# Patient Record
Sex: FEMALE | Race: ASIAN | NOT HISPANIC OR LATINO
[De-identification: names, ages, dates, MRNs, and addresses within clinical notes are randomized per-mention and may not be internally consistent; named-entity substitution may affect disease eponyms.]

---

## 2020-01-27 PROBLEM — Z00.00 ENCOUNTER FOR PREVENTIVE HEALTH EXAMINATION: Status: ACTIVE | Noted: 2020-01-27

## 2020-02-11 ENCOUNTER — APPOINTMENT (OUTPATIENT)
Dept: PULMONOLOGY | Facility: CLINIC | Age: 65
End: 2020-02-11
Payer: MEDICAID

## 2020-02-11 VITALS
WEIGHT: 120 LBS | SYSTOLIC BLOOD PRESSURE: 117 MMHG | DIASTOLIC BLOOD PRESSURE: 78 MMHG | TEMPERATURE: 98 F | RESPIRATION RATE: 16 BRPM | BODY MASS INDEX: 21.26 KG/M2 | HEIGHT: 63 IN | HEART RATE: 74 BPM | OXYGEN SATURATION: 98 %

## 2020-02-11 DIAGNOSIS — Z85.09 PERSONAL HISTORY OF MALIGNANT NEOPLASM OF OTHER DIGESTIVE ORGANS: ICD-10-CM

## 2020-02-11 DIAGNOSIS — E03.9 HYPOTHYROIDISM, UNSPECIFIED: ICD-10-CM

## 2020-02-11 DIAGNOSIS — R06.83 SNORING: ICD-10-CM

## 2020-02-11 DIAGNOSIS — F51.04 PSYCHOPHYSIOLOGIC INSOMNIA: ICD-10-CM

## 2020-02-11 PROCEDURE — 99204 OFFICE O/P NEW MOD 45 MIN: CPT

## 2020-02-11 RX ORDER — LEVOTHYROXINE SODIUM 137 UG/1
TABLET ORAL
Refills: 0 | Status: ACTIVE | COMMUNITY

## 2020-02-11 RX ORDER — OMEPRAZOLE 40 MG/1
CAPSULE, DELAYED RELEASE ORAL
Refills: 0 | Status: ACTIVE | COMMUNITY

## 2020-02-11 NOTE — HISTORY OF PRESENT ILLNESS
[FreeTextEntry1] : 65yo F with long standing history of trouble falling asleep and staying asleep. These symptoms are inconsistent and present intermittently. She is sometimes sleepy during the day even if she gets adequate duration the night before. She does snore and sometimes has noted nocturnal gasping and choking. She also falls asleep during the day when engaged in passive situations.

## 2020-02-11 NOTE — REVIEW OF SYSTEMS
[EDS: ESS=____] : daytime somnolence: ESS=[unfilled] [Fatigue] : fatigue [Snoring] : snoring [A.M. Dry Mouth] : a.m. dry mouth [A.M. Headache] : headache present upon awakening [Difficulty Initiating Sleep] : difficulty falling asleep [Anxious] : anxious [Difficulty Maintaining Sleep] : difficulty maintaining sleep [Chest Pain] : no chest pain [Obesity] : not obese [Anemia] : no anemia [Heartburn] : no heartburn [Lower Extremity Discomfort] : no lower extremity discomfort [Arthralgias] : no arthralgias [Nocturia] : no nocturia [Cataplexy] :  no cataplexy [Unusual Sleep Behavior] : no unusual sleep behavior

## 2020-02-11 NOTE — ASSESSMENT
[FreeTextEntry1] : 65yo F with long standing history of trouble falling asleep and staying asleep. These symptoms are inconsistent and present intermittently. She is sometimes sleepy during the day even if she gets adequate duration the night before. She does snore and sometimes has noted nocturnal gasping and choking. She also falls asleep during the day when engaged in passive situations. \par \par Crowded airway, snoring, gasping and choking -- at risk for DIANA\par Will order diagnostic PSG\par I explained the rationale for treatment of DIANA -- to improve quality of life, daytime function and to decrease the cardiometabolic and other medical risks that are associated with untreated DIANA. The patient verbalized understanding.\par I also explained that the patient can expect a follow up call once results of the above study becomes available.\par \par Chronic insomnia may be comorbid -- will address further if no significant DIANA is present\par Stimulus control: I advised the patient to avoid spending large amounts of time in bed awake, to get into bed only when sleepy in order to increase sleep efficiency. I explained the role of this intervention in stregthening the connection between the bed, bedroom environment and the ability to fall asleep.\par She may need CBT

## 2020-02-11 NOTE — PHYSICAL EXAM
[General Appearance - In No Acute Distress] : no acute distress [Normal Conjunctiva] : the conjunctiva exhibited no abnormalities [III] : III [Neck Appearance] : the appearance of the neck was normal [] : the neck was supple [Heart Rate And Rhythm] : heart rate was normal and rhythm regular [Heart Sounds] : normal S1 and S2 [Respiration, Rhythm And Depth] : normal respiratory rhythm and effort [Auscultation Breath Sounds / Voice Sounds] : lungs were clear to auscultation bilaterally [Involuntary Movements] : no involuntary movements were seen [Nail Clubbing] : no clubbing of the fingernails [Non-Pitting] : non-pitting [Skin Color & Pigmentation] : normal skin color and pigmentation [No Focal Deficits] : no focal deficits [Oriented To Time, Place, And Person] : oriented to person, place, and time [Low Lying Soft Palate] : low lying soft palate [Normal Oropharynx] : abnormal oropharynx

## 2020-04-22 ENCOUNTER — APPOINTMENT (OUTPATIENT)
Dept: SLEEP CENTER | Facility: CLINIC | Age: 65
End: 2020-04-22

## 2020-06-29 ENCOUNTER — FORM ENCOUNTER (OUTPATIENT)
Age: 65
End: 2020-06-29

## 2020-06-30 ENCOUNTER — APPOINTMENT (OUTPATIENT)
Dept: SLEEP CENTER | Facility: CLINIC | Age: 65
End: 2020-06-30
Payer: MEDICAID

## 2020-06-30 ENCOUNTER — OUTPATIENT (OUTPATIENT)
Dept: OUTPATIENT SERVICES | Facility: HOSPITAL | Age: 65
LOS: 1 days | End: 2020-06-30
Payer: MEDICAID

## 2020-06-30 PROCEDURE — 95806 SLEEP STUDY UNATT&RESP EFFT: CPT | Mod: 26

## 2020-06-30 PROCEDURE — 95806 SLEEP STUDY UNATT&RESP EFFT: CPT

## 2020-07-09 DIAGNOSIS — G47.33 OBSTRUCTIVE SLEEP APNEA (ADULT) (PEDIATRIC): ICD-10-CM

## 2020-07-21 ENCOUNTER — APPOINTMENT (OUTPATIENT)
Dept: PULMONOLOGY | Facility: CLINIC | Age: 65
End: 2020-07-21

## 2021-01-08 ENCOUNTER — INPATIENT (INPATIENT)
Facility: HOSPITAL | Age: 66
LOS: 0 days | Discharge: HOME | DRG: 313 | End: 2021-01-09
Attending: INTERNAL MEDICINE | Admitting: INTERNAL MEDICINE
Payer: MEDICAID

## 2021-01-08 VITALS
SYSTOLIC BLOOD PRESSURE: 135 MMHG | OXYGEN SATURATION: 97 % | DIASTOLIC BLOOD PRESSURE: 84 MMHG | TEMPERATURE: 98 F | HEART RATE: 69 BPM | WEIGHT: 145.06 LBS | RESPIRATION RATE: 18 BRPM

## 2021-01-08 LAB
ALBUMIN SERPL ELPH-MCNC: 4.7 G/DL — SIGNIFICANT CHANGE UP (ref 3.3–5)
ALP SERPL-CCNC: 66 U/L — SIGNIFICANT CHANGE UP (ref 40–120)
ALT FLD-CCNC: 17 U/L — SIGNIFICANT CHANGE UP (ref 10–45)
ANION GAP SERPL CALC-SCNC: 9 MMOL/L — SIGNIFICANT CHANGE UP (ref 5–17)
AST SERPL-CCNC: 21 U/L — SIGNIFICANT CHANGE UP (ref 10–40)
BASE EXCESS BLDV CALC-SCNC: 3.9 MMOL/L — HIGH (ref -2–2)
BASOPHILS # BLD AUTO: 0.03 K/UL — SIGNIFICANT CHANGE UP (ref 0–0.2)
BASOPHILS NFR BLD AUTO: 0.6 % — SIGNIFICANT CHANGE UP (ref 0–2)
BILIRUB SERPL-MCNC: 0.2 MG/DL — SIGNIFICANT CHANGE UP (ref 0.2–1.2)
BUN SERPL-MCNC: 15 MG/DL — SIGNIFICANT CHANGE UP (ref 7–23)
CA-I SERPL-SCNC: 1.23 MMOL/L — SIGNIFICANT CHANGE UP (ref 1.12–1.3)
CALCIUM SERPL-MCNC: 9.1 MG/DL — SIGNIFICANT CHANGE UP (ref 8.4–10.5)
CHLORIDE BLDV-SCNC: 106 MMOL/L — SIGNIFICANT CHANGE UP (ref 96–108)
CHLORIDE SERPL-SCNC: 107 MMOL/L — SIGNIFICANT CHANGE UP (ref 96–108)
CO2 BLDV-SCNC: 32 MMOL/L — HIGH (ref 22–30)
CO2 SERPL-SCNC: 29 MMOL/L — SIGNIFICANT CHANGE UP (ref 22–31)
CREAT SERPL-MCNC: 0.67 MG/DL — SIGNIFICANT CHANGE UP (ref 0.5–1.3)
EOSINOPHIL # BLD AUTO: 0.21 K/UL — SIGNIFICANT CHANGE UP (ref 0–0.5)
EOSINOPHIL NFR BLD AUTO: 4.1 % — SIGNIFICANT CHANGE UP (ref 0–6)
GAS PNL BLDV: 145 MMOL/L — SIGNIFICANT CHANGE UP (ref 135–145)
GAS PNL BLDV: SIGNIFICANT CHANGE UP
GAS PNL BLDV: SIGNIFICANT CHANGE UP
GLUCOSE BLDV-MCNC: 94 MG/DL — SIGNIFICANT CHANGE UP (ref 70–99)
GLUCOSE SERPL-MCNC: 101 MG/DL — HIGH (ref 70–99)
HCO3 BLDV-SCNC: 30 MMOL/L — HIGH (ref 21–29)
HCT VFR BLD CALC: 33 % — LOW (ref 34.5–45)
HCT VFR BLDA CALC: 36 % — LOW (ref 39–50)
HGB BLD CALC-MCNC: 11.7 G/DL — SIGNIFICANT CHANGE UP (ref 11.5–15.5)
HGB BLD-MCNC: 11.3 G/DL — LOW (ref 11.5–15.5)
LACTATE BLDV-MCNC: 0.9 MMOL/L — SIGNIFICANT CHANGE UP (ref 0.7–2)
LYMPHOCYTES # BLD AUTO: 2.61 K/UL — SIGNIFICANT CHANGE UP (ref 1–3.3)
LYMPHOCYTES # BLD AUTO: 51 % — HIGH (ref 13–44)
MCHC RBC-ENTMCNC: 33.6 PG — SIGNIFICANT CHANGE UP (ref 27–34)
MCHC RBC-ENTMCNC: 34.2 GM/DL — SIGNIFICANT CHANGE UP (ref 32–36)
MCV RBC AUTO: 98.2 FL — SIGNIFICANT CHANGE UP (ref 80–100)
MONOCYTES # BLD AUTO: 0.33 K/UL — SIGNIFICANT CHANGE UP (ref 0–0.9)
MONOCYTES NFR BLD AUTO: 6.4 % — SIGNIFICANT CHANGE UP (ref 2–14)
NEUTROPHILS # BLD AUTO: 1.94 K/UL — SIGNIFICANT CHANGE UP (ref 1.8–7.4)
NEUTROPHILS NFR BLD AUTO: 37.9 % — LOW (ref 43–77)
NRBC # BLD: 0 /100 WBCS — SIGNIFICANT CHANGE UP (ref 0–0)
NT-PROBNP SERPL-SCNC: 137 PG/ML — SIGNIFICANT CHANGE UP (ref 0–300)
OTHER CELLS CSF MANUAL: 6 ML/DL — LOW (ref 18–22)
PCO2 BLDV: 58 MMHG — HIGH (ref 35–50)
PH BLDV: 7.34 — LOW (ref 7.35–7.45)
PLATELET # BLD AUTO: 190 K/UL — SIGNIFICANT CHANGE UP (ref 150–400)
PO2 BLDV: 25 MMHG — SIGNIFICANT CHANGE UP (ref 25–45)
POTASSIUM BLDV-SCNC: 3.4 MMOL/L — LOW (ref 3.5–5.3)
POTASSIUM SERPL-MCNC: 3.7 MMOL/L — SIGNIFICANT CHANGE UP (ref 3.5–5.3)
POTASSIUM SERPL-SCNC: 3.7 MMOL/L — SIGNIFICANT CHANGE UP (ref 3.5–5.3)
PROT SERPL-MCNC: 6.9 G/DL — SIGNIFICANT CHANGE UP (ref 6–8.3)
RBC # BLD: 3.36 M/UL — LOW (ref 3.8–5.2)
RBC # FLD: 14.1 % — SIGNIFICANT CHANGE UP (ref 10.3–14.5)
SAO2 % BLDV: 37 % — LOW (ref 67–88)
SODIUM SERPL-SCNC: 145 MMOL/L — SIGNIFICANT CHANGE UP (ref 135–145)
TROPONIN T, HIGH SENSITIVITY RESULT: 6 NG/L — SIGNIFICANT CHANGE UP (ref 0–51)
WBC # BLD: 5.12 K/UL — SIGNIFICANT CHANGE UP (ref 3.8–10.5)
WBC # FLD AUTO: 5.12 K/UL — SIGNIFICANT CHANGE UP (ref 3.8–10.5)

## 2021-01-08 PROCEDURE — 99285 EMERGENCY DEPT VISIT HI MDM: CPT

## 2021-01-08 PROCEDURE — 71046 X-RAY EXAM CHEST 2 VIEWS: CPT | Mod: 26

## 2021-01-08 PROCEDURE — 93010 ELECTROCARDIOGRAM REPORT: CPT

## 2021-01-08 NOTE — ED PROVIDER NOTE - NS ED ROS FT
Constitutional:  (-) fever, (-) chills, (-) lethargy  Eyes:  (-) eye pain (-) visual changes  ENMT: (-) nasal discharge, (-) sore throat. (-) neck pain or stiffness (+) facial swelling  Cardiac: (-) chest pain (-) palpitations  Respiratory:  (-) cough (-) respiratory distress.   GI:  (+) nausea (-) vomiting (-) diarrhea (-) abdominal pain.  :  (-) dysuria (-) frequency (-) burning.  MS:  (-) back pain (-) joint pain. (+) LE Edema  Neuro:  (-) headache (-) numbness (-) tingling (-) focal weakness  Skin:  (-) rash  Except as documented in the HPI,  all other systems are negative

## 2021-01-08 NOTE — ED PROVIDER NOTE - OBJECTIVE STATEMENT
65F PMH GIST treated w/ imatinib, hypothyroidism presents to ED for 5 weeks intermittent hypotension @ home w/ systolic BP in 80s/90s, chest tightness w/ pleuritic pain, lower extremity edema. As per daughter pt has been having these symptoms x5 weeks, usually around the afternoon with episodes that last around 1-2 hours, which self resolves. Nothing makes symptoms better or worse. Pt came to ED today because sx included nausea today and have been more severe than prior.

## 2021-01-08 NOTE — ED PROVIDER NOTE - PROGRESS NOTE DETAILS
Onur Corona, DO PGY-1: Spoke to Dr. Garcia, advised admitting to Dr. Smith Onur Corona,  PGY-1: Paged Dr. Smith's answering service

## 2021-01-08 NOTE — ED ADULT NURSE NOTE - OBJECTIVE STATEMENT
patient is a 65 year old female with a PMH of hypothyroidism, GIST cancer (tumor in stomach) (oral chemo everyday), who presents to the ED from home complaining of chest tightness with dizziness, weakness, and nausea. patient daughter is on the phone to translate. patient complaining of ha at this time. patient is aaox3, lungs CTA bilaterally, abd soft, nondistended, nontender, cap refill <3, radial pulses +2 bilaterally. patient denies fever, chills, cough, v/d, abd pain, back pain, changes in bowel or bladder, hematuria, bloody stool. patient bilateral lower limb swelling. as per daughter her lower extremities have been increasingly getting swollen. patient resting on stretcher. patient IV Placed. EKG done. MD at bedside to assess.

## 2021-01-08 NOTE — ED ADULT NURSE NOTE - NSIMPLEMENTINTERV_GEN_ALL_ED
Implemented All Universal Safety Interventions:  [place] to call system. Call bell, personal items and telephone within reach. Instruct patient to call for assistance. Room bathroom lighting operational. Non-slip footwear when patient is off stretcher. Physically safe environment: no spills, clutter or unnecessary equipment. Stretcher in lowest position, wheels locked, appropriate side rails in place.

## 2021-01-08 NOTE — ED CLERICAL - NS ED CLERK NOTE PRE-ARRIVAL INFORMATION; ADDITIONAL PRE-ARRIVAL INFORMATION
CC/Reason For referral: sob and hypotensive   Preferred Consultant(if applicable):  Who admits for you (if needed): Dr. Segundo  Do you have documents you would like to fax over?  Would you still like to speak to an ED attending? yes! CC/Reason For referral:  chest pain    Preferred Consultant(if applicable):  Who admits for you (if needed): Dr. Segundo  Do you have documents you would like to fax over?  Would you still like to speak to an ED attending? yes!

## 2021-01-08 NOTE — ED PROVIDER NOTE - PHYSICAL EXAMINATION
CONSTITUTIONAL: tired appearing, in NAD  SKIN: Warm dry, normal skin turgor  HEAD: NCAT  EYES: EOMI, PERRLA, no scleral icterus, conjunctiva pink  ENT: normal pharynx with no erythema or exudates  NECK: Supple; non tender. Full ROM.  CARD: RRR, no murmurs.  RESP: clear to ausculation b/l. No crackles or wheezing.  ABD: soft, non-tender, non-distended, no rebound or guarding.  EXT: 1+ pitting bilateral lower extremity edema, no calf tenderness  PSYCH: Cooperative, appropriate.

## 2021-01-08 NOTE — ED PROVIDER NOTE - CLINICAL SUMMARY MEDICAL DECISION MAKING FREE TEXT BOX
65F PMH GIST treated w/ imatinib, hypothyroidism presents to ED for 5 weeks intermittent hypotension @ home w/ systolic BP in 80s/90s, chest tightness w/ pleuritic pain, lower extremity edema, no cardiac/ resp findings on physical exam, pt normotensive but endorses hypotensive episodes @ home. Concern for pericardiomediastinitis consider pleural effusion, consider hypothyroidism, low concern for PE, less concern for infectious cause, low concern for ACS, cbc cmp trop bnp cxr ct head non-con to r/o mets

## 2021-01-08 NOTE — ED PROVIDER NOTE - ATTENDING CONTRIBUTION TO CARE
attending Antonella: 65yF h/o GIST on imatinib, hypothyroidism p/w intermittent chest tightness. Reports symptoms x several weeks, worse today. Episodes last 1-2 hours and self resolve. Denies exertional component. Also today with nausea. Bengin exam. Will obtain ekg, place on tele, labs, cxr, discuss with PMD, reassess

## 2021-01-09 ENCOUNTER — TRANSCRIPTION ENCOUNTER (OUTPATIENT)
Age: 66
End: 2021-01-09

## 2021-01-09 VITALS
SYSTOLIC BLOOD PRESSURE: 103 MMHG | TEMPERATURE: 98 F | RESPIRATION RATE: 18 BRPM | DIASTOLIC BLOOD PRESSURE: 65 MMHG | OXYGEN SATURATION: 98 % | HEART RATE: 64 BPM

## 2021-01-09 DIAGNOSIS — R07.9 CHEST PAIN, UNSPECIFIED: ICD-10-CM

## 2021-01-09 LAB
APTT BLD: 33.7 SEC — SIGNIFICANT CHANGE UP (ref 27.5–35.5)
CRP SERPL-MCNC: 0.07 MG/DL — SIGNIFICANT CHANGE UP (ref 0–0.4)
D DIMER BLD IA.RAPID-MCNC: <150 NG/ML DDU — SIGNIFICANT CHANGE UP
ERYTHROCYTE [SEDIMENTATION RATE] IN BLOOD: 3 MM/HR — SIGNIFICANT CHANGE UP (ref 0–20)
INR BLD: 0.9 RATIO — SIGNIFICANT CHANGE UP (ref 0.88–1.16)
NT-PROBNP SERPL-SCNC: 132 PG/ML — SIGNIFICANT CHANGE UP (ref 0–300)
NT-PROBNP SERPL-SCNC: 153 PG/ML — SIGNIFICANT CHANGE UP (ref 0–300)
PROTHROM AB SERPL-ACNC: 10.8 SEC — SIGNIFICANT CHANGE UP (ref 10.6–13.6)
SARS-COV-2 RNA SPEC QL NAA+PROBE: SIGNIFICANT CHANGE UP
TROPONIN T, HIGH SENSITIVITY RESULT: <6 NG/L — SIGNIFICANT CHANGE UP (ref 0–51)
TROPONIN T, HIGH SENSITIVITY RESULT: <6 NG/L — SIGNIFICANT CHANGE UP (ref 0–51)
TSH SERPL-MCNC: 6.03 UIU/ML — HIGH (ref 0.27–4.2)

## 2021-01-09 PROCEDURE — 85025 COMPLETE CBC W/AUTO DIFF WBC: CPT

## 2021-01-09 PROCEDURE — 86769 SARS-COV-2 COVID-19 ANTIBODY: CPT

## 2021-01-09 PROCEDURE — 84132 ASSAY OF SERUM POTASSIUM: CPT

## 2021-01-09 PROCEDURE — 82803 BLOOD GASES ANY COMBINATION: CPT

## 2021-01-09 PROCEDURE — 70450 CT HEAD/BRAIN W/O DYE: CPT

## 2021-01-09 PROCEDURE — U0003: CPT

## 2021-01-09 PROCEDURE — 70450 CT HEAD/BRAIN W/O DYE: CPT | Mod: 26

## 2021-01-09 PROCEDURE — 82435 ASSAY OF BLOOD CHLORIDE: CPT

## 2021-01-09 PROCEDURE — 93005 ELECTROCARDIOGRAM TRACING: CPT

## 2021-01-09 PROCEDURE — 99285 EMERGENCY DEPT VISIT HI MDM: CPT | Mod: 25

## 2021-01-09 PROCEDURE — 85652 RBC SED RATE AUTOMATED: CPT

## 2021-01-09 PROCEDURE — 83605 ASSAY OF LACTIC ACID: CPT

## 2021-01-09 PROCEDURE — 84443 ASSAY THYROID STIM HORMONE: CPT

## 2021-01-09 PROCEDURE — 85730 THROMBOPLASTIN TIME PARTIAL: CPT

## 2021-01-09 PROCEDURE — 83880 ASSAY OF NATRIURETIC PEPTIDE: CPT

## 2021-01-09 PROCEDURE — 85014 HEMATOCRIT: CPT

## 2021-01-09 PROCEDURE — 85018 HEMOGLOBIN: CPT

## 2021-01-09 PROCEDURE — 82330 ASSAY OF CALCIUM: CPT

## 2021-01-09 PROCEDURE — U0005: CPT

## 2021-01-09 PROCEDURE — 84295 ASSAY OF SERUM SODIUM: CPT

## 2021-01-09 PROCEDURE — 71046 X-RAY EXAM CHEST 2 VIEWS: CPT

## 2021-01-09 PROCEDURE — 83690 ASSAY OF LIPASE: CPT

## 2021-01-09 PROCEDURE — 85379 FIBRIN DEGRADATION QUANT: CPT

## 2021-01-09 PROCEDURE — 80053 COMPREHEN METABOLIC PANEL: CPT

## 2021-01-09 PROCEDURE — 84484 ASSAY OF TROPONIN QUANT: CPT

## 2021-01-09 PROCEDURE — 86140 C-REACTIVE PROTEIN: CPT

## 2021-01-09 PROCEDURE — 82947 ASSAY GLUCOSE BLOOD QUANT: CPT

## 2021-01-09 PROCEDURE — 85610 PROTHROMBIN TIME: CPT

## 2021-01-09 RX ORDER — HEPARIN SODIUM 5000 [USP'U]/ML
5000 INJECTION INTRAVENOUS; SUBCUTANEOUS EVERY 12 HOURS
Refills: 0 | Status: DISCONTINUED | OUTPATIENT
Start: 2021-01-09 | End: 2021-01-09

## 2021-01-09 RX ORDER — PANTOPRAZOLE SODIUM 20 MG/1
1 TABLET, DELAYED RELEASE ORAL
Qty: 30 | Refills: 0
Start: 2021-01-09 | End: 2021-02-07

## 2021-01-09 RX ORDER — SODIUM CHLORIDE 9 MG/ML
1000 INJECTION INTRAMUSCULAR; INTRAVENOUS; SUBCUTANEOUS
Refills: 0 | Status: DISCONTINUED | OUTPATIENT
Start: 2021-01-09 | End: 2021-01-09

## 2021-01-09 RX ORDER — LEVOTHYROXINE SODIUM 125 MCG
1 TABLET ORAL
Qty: 0 | Refills: 0 | DISCHARGE
Start: 2021-01-09

## 2021-01-09 RX ORDER — LEVOTHYROXINE SODIUM 125 MCG
50 TABLET ORAL DAILY
Refills: 0 | Status: DISCONTINUED | OUTPATIENT
Start: 2021-01-09 | End: 2021-01-09

## 2021-01-09 RX ORDER — PANTOPRAZOLE SODIUM 20 MG/1
40 TABLET, DELAYED RELEASE ORAL
Refills: 0 | Status: DISCONTINUED | OUTPATIENT
Start: 2021-01-09 | End: 2021-01-09

## 2021-01-09 RX ADMIN — SODIUM CHLORIDE 70 MILLILITER(S): 9 INJECTION INTRAMUSCULAR; INTRAVENOUS; SUBCUTANEOUS at 09:18

## 2021-01-09 NOTE — DISCHARGE NOTE PROVIDER - HOSPITAL COURSE
65 y /o F PMH GIST treated w/ imatinib, hypothyroidism presents to ED for 5 weeks intermittent hypotension @ home w/ systolic BP in 80s/90s, chest tightness w/ pleuritic pain, lower extremity edema.   As per daughter pt has been having these symptoms x5 weeks, usually around the afternoon with episodes that last around 1-2 hours, which self resolves   Pt came to ED yesterday  because sx included nausea today and have been more severe than prior. Evaluated by cardiology who recommended echo and possible nuclear stress testing.  At this time the patient states that she believes her symptoms are secondary to her Gleevac, she has had an echocardiogram in the past which was negative and she would thus prefer to leave AMA and follow up with her providers as outpatient for further testing.  Discussed importance of all planned testing to rule out cardiac etiology however refusing to stay.    Discussion had via her daughter (Alyssia [phone]) on speaker phone as requested by patient who has signs AMA forms.

## 2021-01-09 NOTE — H&P ADULT - NSICDXPASTMEDICALHX_GEN_ALL_CORE_FT
[FreeTextEntry1] : This note was written by Janeth Koroma on 04/18/2019  acting as scribe for Dr. Vuong and JOSE EDUARDO Doshi.\par 
PAST MEDICAL HISTORY:  GIST, non-malignant     Hypothyroidism

## 2021-01-09 NOTE — H&P ADULT - ASSESSMENT
pt w/  hx gist on tx as per heme/onc w/ chest pain/low blood pressure edema  / med related  vs cardiac  echo r/o effusion  trops  check d dimer if elevated cta  dvt proph  hypothyroidism  c/w levothyroxine  discussed w/ heme/ cards / daughter

## 2021-01-09 NOTE — DISCHARGE NOTE NURSING/CASE MANAGEMENT/SOCIAL WORK - PATIENT PORTAL LINK FT
You can access the FollowMyHealth Patient Portal offered by [facility] by registering at the following website: [email or web address]. By joining [facility]’s FollowMyHealth portal, you will also be able to view your health information using other applications (apps) compatible with our system.

## 2021-01-09 NOTE — CONSULT NOTE ADULT - SUBJECTIVE AND OBJECTIVE BOX
HPI:    65 y /o F PMH GIST treated w/ imatinib, hypothyroidism presents to ED for 5 weeks intermittent hypotension @ home w/ systolic BP in 80s/90s, chest tightness w/ pleuritic pain, lower extremity edema.   As per daughter pt has been having these symptoms x5 weeks, usually around the afternoon with episodes that last around 1-2 hours, which self resolves   Pt came to ED yesterday  because sx included nausea today and have been more severe than prior.      Pt evaluated in ED, CXR Negative. Troponin neg x 2. BNP Normal at 132    COVID Neg    Non Contrast CT Head near complete opacification of bilat sphenoid sinuses      PAST MEDICAL & SURGICAL HISTORY:  GIST, non-malignant    Hypothyroidism        ROS:  Negative except for:    MEDICATIONS  (STANDING):  heparin   Injectable 5000 Unit(s) SubCutaneous every 12 hours  levothyroxine 50 MICROGram(s) Oral daily  pantoprazole    Tablet 40 milliGRAM(s) Oral before breakfast  sodium chloride 0.9%. 1000 milliLiter(s) (70 mL/Hr) IV Continuous <Continuous>    MEDICATIONS  (PRN):      Allergies    No Known Allergies    Intolerances        Vital Signs Last 24 Hrs  T(C): 36.9 (09 Jan 2021 09:00), Max: 36.9 (09 Jan 2021 05:15)  T(F): 98.4 (09 Jan 2021 09:00), Max: 98.4 (09 Jan 2021 05:15)  HR: 64 (09 Jan 2021 09:00) (64 - 69)  BP: 103/65 (09 Jan 2021 09:00) (101/63 - 135/84)  BP(mean): --  RR: 18 (09 Jan 2021 09:00) (18 - 18)  SpO2: 98% (09 Jan 2021 09:00) (97% - 98%)    PHYSICAL EXAM  General: adult in NAD  HEENT: clear oropharynx, anicteric sclera, pink conjunctiva  Neck: supple  CV: normal S1/S2 with no murmur rubs or gallops  Lungs: positive air movement b/l ant lungs,clear to auscultation, no wheezes, no rales  Abdomen: soft non-tender non-distended, no hepatosplenomegaly  Ext: no clubbing cyanosis or edema  Skin: no rashes and no petechiae  Neuro: alert and oriented X 4, no focal deficits  LABS:                          11.3   5.12  )-----------( 190      ( 08 Jan 2021 23:15 )             33.0     Serial CBC's  01-08 @ 23:15  Hct-33.0 / Hgb-11.3 / Plat-190 / RBC-3.36 / WBC-5.12            01-08    145  |  107  |  15  ----------------------------<  101<H>  3.7   |  29  |  0.67    Ca    9.1      08 Jan 2021 23:15    TPro  6.9  /  Alb  4.7  /  TBili  0.2  /  DBili  x   /  AST  21  /  ALT  17  /  AlkPhos  66  01-08      PT/INR - ( 08 Jan 2021 23:29 )   PT: 10.8 sec;   INR: 0.90 ratio         PTT - ( 08 Jan 2021 23:29 )  PTT:33.7 sec    WBC Count: 5.12 K/uL (01-08 @ 23:15)  Hemoglobin: 11.3 g/dL (01-08 @ 23:15)            RADIOLOGY & ADDITIONAL STUDIES:

## 2021-01-09 NOTE — H&P ADULT - HISTORY OF PRESENT ILLNESS
65 y /o F PMH GIST treated w/ imatinib, hypothyroidism presents to ED for 5 weeks intermittent hypotension @ home w/ systolic BP in 80s/90s, chest tightness w/ pleuritic pain, lower extremity edema.   As per daughter pt has been having these symptoms x5 weeks, usually around the afternoon with episodes that last around 1-2 hours, which self resolves   Pt came to ED yesterday  because sx included nausea today and have been more severe than prior.

## 2021-01-09 NOTE — CHART NOTE - NSCHARTNOTEFT_GEN_A_CORE
Conversation had with patient who is Mandarin speaking via daughter on speaker phone as requested.  At this time the patient requests to leave Against Medical Advise.  She states that some of the pending tests she has had in the the past and was negative.  Despite her presenting symptoms she prefers to follow up with her outpatient providers for further work up.  She states that she feels well at this time and takes full responsibility for any adverse effects which may occur with leaving AMA.  Medicine attending Dr. Smith made aware.    Amanda Nelson, ANP-C   [phone]

## 2021-01-09 NOTE — CONSULT NOTE ADULT - ASSESSMENT
1. GIST    -- on Imatinib 400mg PO QD  -- cont tx    2. Atypical CP    -- Troponin neg x 2 and NL BNP  -- cardio input appreciated  -- Await Echo  -- outpt stress test    3. Sinusitis    -- outpt ENT Eval    Diandra Duran MD  [phone]
 #GIST treated w/ imatinib  #hypothyroidism  #atypical chest pain: Discussed with patient, daughter and Dr Smith. Await d dimer result. Echo.   Recommend nuclear stress test.  Will follow with you .

## 2021-01-09 NOTE — DISCHARGE NOTE PROVIDER - NSDCMRMEDTOKEN_GEN_ALL_CORE_FT
levothyroxine 50 mcg (0.05 mg) oral tablet: 1 tab(s) orally once a day  pantoprazole 40 mg oral delayed release tablet: 1 tab(s) orally once a day (before a meal)

## 2021-01-09 NOTE — H&P ADULT - NSHPLABSRESULTS_GEN_ALL_CORE
11.3   5.12  )-----------( 190      ( 08 Jan 2021 23:15 )             33.0       01-08    145  |  107  |  15  ----------------------------<  101<H>  3.7   |  29  |  0.67    Ca    9.1      08 Jan 2021 23:15    TPro  6.9  /  Alb  4.7  /  TBili  0.2  /  DBili  x   /  AST  21  /  ALT  17  /  AlkPhos  66  01-08                  PT/INR - ( 08 Jan 2021 23:29 )   PT: 10.8 sec;   INR: 0.90 ratio         PTT - ( 08 Jan 2021 23:29 )  PTT:33.7 sec    Lactate Trend            CAPILLARY BLOOD GLUCOSE

## 2021-01-09 NOTE — DISCHARGE NOTE PROVIDER - CARE PROVIDER_API CALL
Chet Gómez  CARDIOVASCULAR DISEASE  [address], [address]  [place] [address]  Phone: [phone]  Fax: [phone]  Follow Up Time:     Dr. Vance  Primary Medicine Doctor  Phone: (   )    -  Fax: (   )    -  Follow Up Time:

## 2021-01-09 NOTE — DISCHARGE NOTE PROVIDER - PROVIDER TOKENS
PROVIDER:[TOKEN:[[id number]]],FREE:[LAST:[Vance],FIRST:[],PHONE:[(   )    -],FAX:[(   )    -],ADDRESS:[Primary Medicine Doctor]]

## 2021-01-09 NOTE — DISCHARGE NOTE PROVIDER - NSDCCPCAREPLAN_GEN_ALL_CORE_FT
PRINCIPAL DISCHARGE DIAGNOSIS  Diagnosis: Chest pain  Assessment and Plan of Treatment: Signed out AMA.  Follow up with your PCP and Cardiology.  Please call to schedule your appointments.      SECONDARY DISCHARGE DIAGNOSES  Diagnosis: Gastrointestinal stromal tumor (GIST)  Assessment and Plan of Treatment:      PRINCIPAL DISCHARGE DIAGNOSIS  Diagnosis: Chest pain  Assessment and Plan of Treatment: Signed out AMA.  Follow up with your PCP and Cardiology.  Please call to schedule your appointments.      SECONDARY DISCHARGE DIAGNOSES  Diagnosis: Hypothyroidism  Assessment and Plan of Treatment: Take your medications as prescribed and folow up with your PCP.    Diagnosis: Gastrointestinal stromal tumor (GIST)  Assessment and Plan of Treatment: Follow up with your oncologist as outpatient.

## 2021-01-09 NOTE — ED ADULT NURSE REASSESSMENT NOTE - NS ED NURSE REASSESS COMMENT FT1
patient requesting to leave hospital at this time. patient educated on importance of staying. MD to speak to patient using .

## 2021-01-10 LAB
SARS-COV-2 IGG SERPL QL IA: NEGATIVE — SIGNIFICANT CHANGE UP
SARS-COV-2 IGM SERPL IA-ACNC: <0.1 INDEX — SIGNIFICANT CHANGE UP

## 2021-06-28 NOTE — ED PROVIDER NOTE - PRINCIPAL DIAGNOSIS
Home blood pressures and blood pressure here today is too high  Continue to take your amlodipine in the morning but I a m  prescribing lisinopril at night    Have the blood test done fasting in 4 days after starting lisinopril but drink water before the test  Chest pain

## 2022-10-24 NOTE — CONSULT NOTE ADULT - SUBJECTIVE AND OBJECTIVE BOX
LOV 09/15/22. RTC 4 months. F/u 01/20/23. Pended 3 month supply. CHIEF COMPLAINT: Chest Pain    HPI:  65 year old woman who speaks only Mandarin. Spoke with daughter via [employer] while at bedside, who serves as . +h/o GIST treated w/ imatinib, hypothyroidism presented to ED with history  5 weeks intermittent hypotension @ home w/ systolic BP in 80s/90s, chest tightness, lower extremity edema Usually occurs around  the afternoon nap time, with episodes that last around 1-2 hours, self resolves. COVID negative. Daughter relates that patient had similar symptoms about 3 years ago, had cardiac workup including negative stress test at that time, details unclear. No known history ashd.Not exertional, not related to food. +anemia, troponins negative. Asymptomatic at this time, anxious to go home.      PAST MEDICAL & SURGICAL HISTORY:  GIST, non-malignant    Hypothyroidism    Allergies    No Known Allergies    MEDICATIONS:  heparin   Injectable 5000 Unit(s) SubCutaneous every 12 hours  pantoprazole    Tablet 40 milliGRAM(s) Oral before breakfast  sodium chloride 0.9%. 1000 milliLiter(s) IV Continuous <Continuous>      REVIEW OF SYSTEMS:    CONSTITUTIONAL: No weakness, fevers or chills  EYES/ENT: No visual changes;  No vertigo or throat pain   NECK: No pain or stiffness  RESPIRATORY: No cough, wheezing, hemoptysis; No shortness of breath  CARDIOVASCULAR: No chest pain or palpitations  GASTROINTESTINAL: No abdominal or epigastric pain. No nausea, vomiting, or hematemesis; No diarrhea or constipation. No melena or hematochezia.  GENITOURINARY: No dysuria, frequency or hematuria  NEUROLOGICAL: No numbness or weakness  SKIN: No itching, burning, rashes, or lesions   All other review of systems is negative unless indicated above    Vital Signs Last 24 Hrs  T(C): 36.9 (09 Jan 2021 09:00), Max: 36.9 (09 Jan 2021 05:15)  T(F): 98.4 (09 Jan 2021 09:00), Max: 98.4 (09 Jan 2021 05:15)  HR: 64 (09 Jan 2021 09:00) (64 - 69)  BP: 103/65 (09 Jan 2021 09:00) (101/63 - 135/84)  BP(mean): --  RR: 18 (09 Jan 2021 09:00) (18 - 18)  SpO2: 98% (09 Jan 2021 09:00) (97% - 98%)    I&O's Summary      PHYSICAL EXAM:    Constitutional: NAD, awake and alert, well-developed  HEENT: PERR, EOMI  Neck: soft and supple, No LAD, No JVD  Respiratory: Breath sounds are clear bilaterally, No wheezing, rales or rhonchi  Cardiovascular: Regular rate and rhythm, normal S1 and S2,  no murmurs, gallops or rubs   Extremities: No peripheral edema. No clubbing or cyanosis.        LABS: All Labs Reviewed:                        11.3   5.12  )-----------( 190      ( 08 Jan 2021 23:15 )             33.0     08 Jan 2021 23:15    145    |  107    |  15     ----------------------------<  101    3.7     |  29     |  0.67     Ca    9.1        08 Jan 2021 23:15    TPro  6.9    /  Alb  4.7    /  TBili  0.2    /  DBili  x      /  AST  21     /  ALT  17     /  AlkPhos  66     08 Jan 2021 23:15    PT/INR - ( 08 Jan 2021 23:29 )   PT: 10.8 sec;   INR: 0.90 ratio         PTT - ( 08 Jan 2021 23:29 )  PTT:33.7 sec    CARDIAC MARKERS:      proBNP: Serum Pro-Brain Natriuretic Peptide: 132 pg/mL (01-09 @ 00:21)  Serum Pro-Brain Natriuretic Peptide: 137 pg/mL (01-08 @ 23:15)    TSH: Thyroid Stimulating Hormone, Serum: 6.03 uIU/mL (01-09 @ 05:13)    EKG:

## 2025-01-23 ENCOUNTER — APPOINTMENT (OUTPATIENT)
Dept: RHEUMATOLOGY | Facility: CLINIC | Age: 70
End: 2025-01-23